# Patient Record
Sex: FEMALE | Race: WHITE | URBAN - METROPOLITAN AREA
[De-identification: names, ages, dates, MRNs, and addresses within clinical notes are randomized per-mention and may not be internally consistent; named-entity substitution may affect disease eponyms.]

---

## 2020-03-11 ENCOUNTER — OFFICE VISIT (OUTPATIENT)
Dept: OBGYN CLINIC | Age: 25
End: 2020-03-11

## 2020-03-11 VITALS
BODY MASS INDEX: 24.7 KG/M2 | DIASTOLIC BLOOD PRESSURE: 78 MMHG | WEIGHT: 148.25 LBS | SYSTOLIC BLOOD PRESSURE: 128 MMHG | HEIGHT: 65 IN

## 2020-03-11 DIAGNOSIS — M54.50 LOW BACK PAIN, UNSPECIFIED BACK PAIN LATERALITY, UNSPECIFIED CHRONICITY, UNSPECIFIED WHETHER SCIATICA PRESENT: Primary | ICD-10-CM

## 2020-03-11 DIAGNOSIS — Z20.2 POSSIBLE EXPOSURE TO STD: ICD-10-CM

## 2020-03-11 RX ORDER — IBUPROFEN 800 MG/1
800 TABLET ORAL
Qty: 40 TAB | Refills: 1 | Status: SHIPPED | OUTPATIENT
Start: 2020-03-11

## 2020-03-11 NOTE — PATIENT INSTRUCTIONS

## 2020-03-11 NOTE — PROGRESS NOTES
Back pain    CC: Lower back pain    HPI: Ms. Faustino Palacios is a 25 y.o.  who presents with a history of episodic back pain. Had TVUS elsewhere which she says was unremarkable. Not SA    Onset: months  Quality: crampy  Severity: bothersome  Timing: acute  Context: usually after menses but not always    Other associated symptoms:   Denies changes in bowel or bladder function. Denies fevers/chills. Denies dyspareunia.         Past Medical History:   Diagnosis Date    SVT (supraventricular tachycardia) (HCC)        Past Surgical History:   Procedure Laterality Date    HX SVT ABLATION         Family History   Problem Relation Age of Onset    Other Father         Graves disease       Social History     Socioeconomic History    Marital status: SINGLE     Spouse name: Not on file    Number of children: Not on file    Years of education: Not on file    Highest education level: Not on file   Occupational History    Not on file   Social Needs    Financial resource strain: Not on file    Food insecurity     Worry: Not on file     Inability: Not on file    Transportation needs     Medical: Not on file     Non-medical: Not on file   Tobacco Use    Smoking status: Never Smoker    Smokeless tobacco: Never Used   Substance and Sexual Activity    Alcohol use: Yes     Frequency: Monthly or less    Drug use: Yes     Types: Marijuana    Sexual activity: Yes     Partners: Male     Birth control/protection: Condom     Comment: condoms most of the time   Lifestyle    Physical activity     Days per week: Not on file     Minutes per session: Not on file    Stress: Not on file   Relationships    Social connections     Talks on phone: Not on file     Gets together: Not on file     Attends Anabaptism service: Not on file     Active member of club or organization: Not on file     Attends meetings of clubs or organizations: Not on file     Relationship status: Not on file    Intimate partner violence Fear of current or ex partner: Not on file     Emotionally abused: Not on file     Physically abused: Not on file     Forced sexual activity: Not on file   Other Topics Concern    Not on file   Social History Narrative    Not on file           Not on File    Review of Systems - History obtained from the patient  Constitutional: negative for weight loss, fever, night sweats  HEENT: negative for hearing loss, earache, congestion, snoring, sorethroat  CV: negative for chest pain, palpitations, edema  Resp: negative for cough, shortness of breath, wheezing  GI: negative for change in bowel habits, abdominal pain, black or bloody stools  : negative for frequency, dysuria, hematuria, vaginal discharge  MSK: negative for back pain, joint pain, muscle pain  Breast: negative for breast lumps, nipple discharge, galactorrhea  Skin :negative for itching, rash, hives  Neuro: negative for dizziness, headache, confusion, weakness  Psych: negative for anxiety, depression, change in mood  Heme/lymph: negative for bleeding, bruising, pallor      Physical Exam    Visit Vitals  /78 (BP 1 Location: Right arm, BP Patient Position: Sitting)   Ht 5' 5\" (1.651 m)   Wt 148 lb 4 oz (67.2 kg)   LMP 03/09/2020 (Exact Date)   BMI 24.67 kg/m²     Constitutional  · Appearance: well-nourished, well developed, alert, in no acute distress    HENT  · Head and Face: appears normal    Neck  · Inspection/Palpation: normal appearance, no masses or tenderness  · Lymph Nodes: no lymphadenopathy present  · Thyroid: gland size normal, nontender, no nodules or masses present on palpation    Chest  · Respiratory Effort: non-labored breathing  · Auscultation: CTAB, normal breath sounds    Cardiovascular  · Heart:  · Auscultation: regular rate and rhythm without murmur  · Extremities: no peripheral edema    Gastrointestinal  · Abdominal Examination: abdomen non-tender to palpation, normal bowel sounds, no masses present  · Liver and spleen: no hepatomegaly present, spleen not palpable  · Hernias: no hernias identified    Genitourinary  · External Genitalia: normal appearance for age, no discharge present, no tenderness present, no inflammatory lesions present, no masses present, no atrophy present  · Vagina: normal vaginal vault without central or paravaginal defects, no discharge present, no inflammatory lesions present, no masses present  · Bladder: non-tender to palpation  · Urethra: appears normal  · Cervix: normal, no cervicitis, no CMT  · Uterus: approximately 6 week sized  · Adnexa: no adnexal tenderness present, no adnexal masses present  · Perineum: perineum within normal limits, no evidence of trauma, no rashes or skin lesions present    Skin  · General Inspection: no rash, no lesions identified    Neurologic/Psychiatric  · Mental Status:  · Orientation: grossly oriented to person, place and time  · Mood and Affect: mood normal, affect appropriate        No results found for this or any previous visit. Assessment/Plan:  Back pain of unknown origin. Continue NSAIDS which appear to be working for her. STI testing sent  RTC for annual or sooner if needed.     Signed By: Jessica Silveira MD     March 11, 2020

## 2020-03-13 LAB — BACTERIA UR CULT: NO GROWTH

## 2020-03-16 LAB
A VAGINAE DNA VAG QL NAA+PROBE: ABNORMAL SCORE
BVAB2 DNA VAG QL NAA+PROBE: ABNORMAL SCORE
C ALBICANS DNA VAG QL NAA+PROBE: NEGATIVE
C GLABRATA DNA VAG QL NAA+PROBE: NEGATIVE
C TRACH DNA VAG QL NAA+PROBE: POSITIVE
MEGA1 DNA VAG QL NAA+PROBE: ABNORMAL SCORE
N GONORRHOEA DNA VAG QL NAA+PROBE: NEGATIVE
T VAGINALIS DNA VAG QL NAA+PROBE: NEGATIVE

## 2020-03-16 RX ORDER — AZITHROMYCIN 500 MG/1
1000 TABLET, FILM COATED ORAL ONCE
Qty: 2 TAB | Refills: 0 | Status: SHIPPED | OUTPATIENT
Start: 2020-03-16 | End: 2020-03-16

## 2020-03-16 RX ORDER — METRONIDAZOLE 500 MG/1
500 TABLET ORAL 2 TIMES DAILY
Qty: 14 TAB | Refills: 0 | Status: SHIPPED | OUTPATIENT
Start: 2020-03-16 | End: 2020-03-23

## 2020-03-17 NOTE — PROGRESS NOTES
Attempted to reach patient to discuss results. Patient did not answer and unable to leave a voicemail. Letter sent asking patient to call the office to discuss lab work results.

## 2020-03-20 ENCOUNTER — TELEPHONE (OUTPATIENT)
Dept: OBGYN CLINIC | Age: 25
End: 2020-03-20

## 2020-03-20 NOTE — PROGRESS NOTES
Per Dr. Milvia Hanson- called and spoke with patient. Informed her of lab results. Patient verbalized understanding and had no questions at this time.

## 2020-03-20 NOTE — TELEPHONE ENCOUNTER
Patient called very upset because she was just told she has chlamydia and BV. She said that she was really upset about getting chlamydia and wanted to know more about the prevention. We discussed that chlamydia is a STD infection that occurs from unprotected intercourse. We recommend always using protection during intercourse. She needs to not have intercourse until medication is taken and 2 weeks after to prevent spreading. She needs to tell her partner(s) so they may get treatment. Discussed she can take her medication to completion exactly as prescribed and recommended coming in for a JINNY to verify it cleared. Patient verbalized understanding.

## 2020-05-11 ENCOUNTER — OFFICE VISIT (OUTPATIENT)
Dept: OBGYN CLINIC | Age: 25
End: 2020-05-11

## 2020-05-11 VITALS
BODY MASS INDEX: 23.74 KG/M2 | WEIGHT: 142.5 LBS | SYSTOLIC BLOOD PRESSURE: 138 MMHG | HEIGHT: 65 IN | DIASTOLIC BLOOD PRESSURE: 82 MMHG

## 2020-05-11 DIAGNOSIS — Z86.19 HISTORY OF CHLAMYDIA: Primary | ICD-10-CM

## 2020-05-11 NOTE — PATIENT INSTRUCTIONS
Pelvic Exam: Care Instructions Your Care Instructions When your doctor examines all of your pelvic organs, it's called a pelvic exam. Two good reasons to have this kind of exam are to check for sexually transmitted infections (STIs) and to get a Pap test. A Pap test is also called a Pap smear. It checks for early changes that can lead to cancer of the cervix. Sometimes a pelvic exam is part of a regular checkup. Your doctor may ask you to avoid vaginal sex, tampons, vaginal medicines, vaginal sprays or powders, and douching for 1 to 2 days before the test. 
Other times, women have this kind of exam at any time of the month. This is because they have pelvic pain, bleeding, or discharge. Or they may have another pelvic problem. Before your exam, it's important to share some information with your doctor. For example, if you are a survivor of rape or sexual abuse, you can talk about any concerns you may have. Your doctor will also want to know if you are pregnant or use birth control. And he or she will want to hear about any problems, surgeries, or procedures you have had in your pelvic area. You will also need to tell your doctor when your last period was. Follow-up care is a key part of your treatment and safety. Be sure to make and go to all appointments, and call your doctor if you are having problems. It's also a good idea to know your test results and keep a list of the medicines you take. How is a pelvic exam done? · During a pelvic exam, you will: ? Take off your clothes below the waist. You will get a paper or cloth cover to put over the lower half of your body. If this is regular checkup, you may undress completely and put on a gown. ? Lie on your back on an exam table. Your feet will be raised above you. Stirrups will support your feet. · The doctor will: ? Ask you to relax your knees. Your knees need to lean out, toward the walls. ? Check the opening of your vagina for sores or swelling. ? Gently put a tool called a speculum into your vagina. It opens the vagina a little bit. You will feel some pressure. But if you are relaxed, it will not hurt. It lets your doctor see inside the vagina. ? Use a small brush, spatula, or swab to get a sample of cells, if you are having a Pap test or culture. The doctor then removes the speculum. ? Put on gloves and put one or two fingers of one hand into your vagina. The other hand goes on your lower belly. This lets your doctor feel your pelvic organs. You will probably feel some pressure. Try to stay relaxed. ? Put one gloved finger into your rectum and one into your vagina, if needed. This can also help check your pelvic organs. This exam takes about 10 minutes. At the end, you will get a washcloth or tissue to clean your vaginal area. You can then get dressed. Why is a pelvic exam done? A pelvic exam may be done: · As part of a woman's regular physical checkup. The exam may include a Pap test. 
· To check for vaginal infection. · To check for sexually transmitted infections, such as chlamydia or herpes. · To help find the cause of abnormal uterine bleeding. · To look for problems like uterine fibroids, ovarian cysts, or uterine prolapse. · To find the cause of pelvic or belly pain. · Before inserting an intrauterine device (IUD) for birth control. · To collect evidence if you've been sexually assaulted. What are the risks of a pelvic exam? 
There is a small chance that the doctor will find something on a pelvic exam that would not have caused a problem. This is called overdiagnosis. It could lead to tests or treatment you don't need. When should you call for help? Watch closely for changes in your health, and be sure to contact your doctor if you have any problems. Where can you learn more? Go to http://shiv-aissatou.info/ Enter H948 in the search box to learn more about \"Pelvic Exam: Care Instructions. \" 
 Current as of: November 7, 2019Content Version: 12.4 © 7102-9651 [employer]. Care instructions adapted under license by [employer] (which disclaims liability or warranty for this information). If you have questions about a medical condition or this instruction, always ask your healthcare professional. [address] any warranty or liability for your use of this information.

## 2020-05-11 NOTE — PROGRESS NOTES
Problem Visit  CC: JINNY    HPI: Ms. Maci Sequeira is a 25 y.o. [de-identified]  female presenting for a jinny for chlamydia.     Onset: last month  Location: cervix  Severity: bothersome  Context: 2 partners    OB History        0    Para   0    Term   0       0    AB   0    Living   0       SAB   0    TAB   0    Ectopic   0    Molar   0    Multiple   0    Live Births   0                Past Medical History:   Diagnosis Date    SVT (supraventricular tachycardia) (HCC)        Past Surgical History:   Procedure Laterality Date    HX SVT ABLATION         Family History   Problem Relation Age of Onset    Other Father         Graves disease       Social History     Socioeconomic History    Marital status: SINGLE     Spouse name: Not on file    Number of children: Not on file    Years of education: Not on file    Highest education level: Not on file   Occupational History    Not on file   Social Needs    Financial resource strain: Not on file    Food insecurity     Worry: Not on file     Inability: Not on file    Transportation needs     Medical: Not on file     Non-medical: Not on file   Tobacco Use    Smoking status: Never Smoker    Smokeless tobacco: Never Used   Substance and Sexual Activity    Alcohol use: Yes     Frequency: Monthly or less    Drug use: Yes     Types: Marijuana    Sexual activity: Yes     Partners: Male     Birth control/protection: Condom     Comment: condoms most of the time   Lifestyle    Physical activity     Days per week: Not on file     Minutes per session: Not on file    Stress: Not on file   Relationships    Social connections     Talks on phone: Not on file     Gets together: Not on file     Attends Faith service: Not on file     Active member of club or organization: Not on file     Attends meetings of clubs or organizations: Not on file     Relationship status: Not on file    Intimate partner violence     Fear of current or ex partner: Not on file Emotionally abused: Not on file     Physically abused: Not on file     Forced sexual activity: Not on file   Other Topics Concern    Not on file   Social History Narrative    Not on file       Current Outpatient Medications   Medication Sig Dispense Refill    ibuprofen (MOTRIN) 800 mg tablet Take 1 Tab by mouth every eight (8) hours as needed for Pain.  40 Tab 1       Not on File    Review of Systems - History obtained from the patient  Constitutional: negative for weight loss, fever, night sweats  HEENT: negative for hearing loss, earache, congestion, snoring, sorethroat  CV: negative for chest pain, palpitations, edema  Resp: negative for cough, shortness of breath, wheezing  GI: negative for change in bowel habits, abdominal pain, black or bloody stools  : negative for frequency, dysuria, hematuria  MSK: negative for back pain, joint pain, muscle pain  Skin :negative for itching, rash, hives  Neuro: negative for dizziness, headache, confusion, weakness  Psych: negative for anxiety, depression, change in mood  Heme/lymph: negative for bleeding, bruising, pallor    Physical Exam    Visit Vitals  /82 (BP 1 Location: Right arm, BP Patient Position: Sitting)   Ht 5' 5\" (1.651 m)   Wt 142 lb 8 oz (64.6 kg)   LMP 05/09/2020 (Exact Date)   BMI 23.71 kg/m²       HENT  · Head and Face: appears normal    Neck  · Inspection/Palpation: normal appearance, no masses or tenderness  · Lymph Nodes: no lymphadenopathy present  · Thyroid: gland size normal, nontender, no nodules or masses present on palpation    Chest  · Respiratory Effort: non-labored breathing  · Auscultation: Clear to auscultation bilaterlly, normal breath sounds    Cardiovascular  · Heart:  · Auscultation: regular rate and rhythm without murmur  · Extremities: no peripheral edema    Gastrointestinal  · Abdominal Examination: abdomen non-tender to palpation, normal bowel sounds, no masses present  · Liver and spleen: no hepatomegaly present, spleen not palpable  · Hernias: no hernias identified    Genitourinary  · External Genitalia: normal appearance for age, no discharge present, no tenderness present, no inflammatory lesions present, no masses present, no atrophy present  · Vagina: normal vaginal vault without central or paravaginal defects, no inflammatory lesions present, no masses present, no discharge present  · Bladder: non-tender to palpation  · Urethra: appears normal  · Cervix: normal, no cervicitis, no CMT  · Uterus: normal size, shape and consistency, mobile  · Adnexa: no adnexal tenderness present, no adnexal masses present  · Perineum: perineum within normal limits, no evidence of trauma, no rashes or skin lesions present    Skin  · General Inspection: no rash, no lesions identified    Neurologic/Psychiatric  · Mental Status:  · Orientation: grossly oriented to person, place and time  · Mood and Affect: mood normal, affect appropriate    Results for orders placed or performed in visit on 03/11/20   CULTURE, URINE   Result Value Ref Range    Urine Culture, Routine No growth    NUSWAB VAGINITIS PLUS   Result Value Ref Range    Atopobium vaginae Moderate - 1 Score    BVAB 2 High - 2 (A) Score    Megasphaera 1 High - 2 (A) Score    C. albicans, ELIJAH Negative Negative    C. glabrata, ELIJAH Negative Negative    T. vaginalis, ELIJAH Negative Negative    C. trachomatis, ELIJAH Positive (A) Negative    N. gonorrhoeae, ELIJAH Negative Negative         Assessment/Plan:  STI testing sent    RTC: for annual , or sooner prn for problems or concerns. Handouts and instructions provided.     Marcelo Martin  5/11/2020  10:20 AM

## 2020-05-20 LAB
A VAGINAE DNA VAG QL NAA+PROBE: NORMAL SCORE
BVAB2 DNA VAG QL NAA+PROBE: NORMAL SCORE
C ALBICANS DNA VAG QL NAA+PROBE: NEGATIVE
C GLABRATA DNA VAG QL NAA+PROBE: NEGATIVE
C TRACH DNA VAG QL NAA+PROBE: NEGATIVE
MEGA1 DNA VAG QL NAA+PROBE: NORMAL SCORE
N GONORRHOEA DNA VAG QL NAA+PROBE: NEGATIVE
T VAGINALIS DNA VAG QL NAA+PROBE: NEGATIVE

## 2023-02-09 ENCOUNTER — HOSPITAL ENCOUNTER (EMERGENCY)
Age: 28
Discharge: HOME | End: 2023-02-09
Attending: EMERGENCY MEDICINE
Payer: COMMERCIAL

## 2023-02-09 ENCOUNTER — APPOINTMENT (OUTPATIENT)
Dept: GENERAL RADIOLOGY | Age: 28
End: 2023-02-09
Attending: EMERGENCY MEDICINE
Payer: COMMERCIAL

## 2023-02-09 VITALS
WEIGHT: 160 LBS | RESPIRATION RATE: 18 BRPM | HEART RATE: 94 BPM | BODY MASS INDEX: 26.66 KG/M2 | HEIGHT: 65 IN | SYSTOLIC BLOOD PRESSURE: 116 MMHG | DIASTOLIC BLOOD PRESSURE: 62 MMHG | OXYGEN SATURATION: 96 % | TEMPERATURE: 98 F

## 2023-02-09 DIAGNOSIS — E86.0 DEHYDRATION: ICD-10-CM

## 2023-02-09 DIAGNOSIS — R55 VASOVAGAL SYNCOPE: Primary | ICD-10-CM

## 2023-02-09 DIAGNOSIS — F41.9 ANXIETY: ICD-10-CM

## 2023-02-09 LAB
ALBUMIN SERPL-MCNC: 3.8 G/DL (ref 3.5–5)
ALBUMIN/GLOB SERPL: 1 (ref 1.1–2.2)
ALP SERPL-CCNC: 48 U/L (ref 45–117)
ALT SERPL-CCNC: 13 U/L (ref 12–78)
ANION GAP SERPL CALC-SCNC: 5 MMOL/L (ref 5–15)
APPEARANCE UR: CLEAR
AST SERPL-CCNC: 15 U/L (ref 15–37)
BACTERIA URNS QL MICRO: NEGATIVE /HPF
BASOPHILS # BLD: 0.1 K/UL (ref 0–0.1)
BASOPHILS NFR BLD: 0 % (ref 0–1)
BILIRUB SERPL-MCNC: 0.5 MG/DL (ref 0.2–1)
BILIRUB UR QL: NEGATIVE
BNP SERPL-MCNC: 20 PG/ML
BUN SERPL-MCNC: 13 MG/DL (ref 6–20)
BUN/CREAT SERPL: 18 (ref 12–20)
CALCIUM SERPL-MCNC: 8.7 MG/DL (ref 8.5–10.1)
CHLORIDE SERPL-SCNC: 108 MMOL/L (ref 97–108)
CO2 SERPL-SCNC: 25 MMOL/L (ref 21–32)
COLOR UR: ABNORMAL
CREAT SERPL-MCNC: 0.72 MG/DL (ref 0.55–1.02)
DIFFERENTIAL METHOD BLD: ABNORMAL
EOSINOPHIL # BLD: 0.5 K/UL (ref 0–0.4)
EOSINOPHIL NFR BLD: 4 % (ref 0–7)
EPITH CASTS URNS QL MICRO: ABNORMAL /LPF
ERYTHROCYTE [DISTWIDTH] IN BLOOD BY AUTOMATED COUNT: 13.1 % (ref 11.5–14.5)
GLOBULIN SER CALC-MCNC: 3.7 G/DL (ref 2–4)
GLUCOSE SERPL-MCNC: 103 MG/DL (ref 65–100)
GLUCOSE UR STRIP.AUTO-MCNC: NEGATIVE MG/DL
HCG UR QL: NEGATIVE
HCT VFR BLD AUTO: 38.2 % (ref 35–47)
HGB BLD-MCNC: 12.4 G/DL (ref 11.5–16)
HGB UR QL STRIP: ABNORMAL
HYALINE CASTS URNS QL MICRO: ABNORMAL /LPF (ref 0–2)
IMM GRANULOCYTES # BLD AUTO: 0.1 K/UL (ref 0–0.04)
IMM GRANULOCYTES NFR BLD AUTO: 0 % (ref 0–0.5)
KETONES UR QL STRIP.AUTO: NEGATIVE MG/DL
LEUKOCYTE ESTERASE UR QL STRIP.AUTO: NEGATIVE
LYMPHOCYTES # BLD: 3.5 K/UL (ref 0.8–3.5)
LYMPHOCYTES NFR BLD: 26 % (ref 12–49)
MCH RBC QN AUTO: 28.2 PG (ref 26–34)
MCHC RBC AUTO-ENTMCNC: 32.5 G/DL (ref 30–36.5)
MCV RBC AUTO: 86.8 FL (ref 80–99)
MONOCYTES # BLD: 1.1 K/UL (ref 0–1)
MONOCYTES NFR BLD: 8 % (ref 5–13)
NEUTS SEG # BLD: 8 K/UL (ref 1.8–8)
NEUTS SEG NFR BLD: 62 % (ref 32–75)
NITRITE UR QL STRIP.AUTO: NEGATIVE
NRBC # BLD: 0 K/UL (ref 0–0.01)
NRBC BLD-RTO: 0 PER 100 WBC
PH UR STRIP: 6 (ref 5–8)
PLATELET # BLD AUTO: 317 K/UL (ref 150–400)
PMV BLD AUTO: 9.4 FL (ref 8.9–12.9)
POTASSIUM SERPL-SCNC: 3.5 MMOL/L (ref 3.5–5.1)
PROT SERPL-MCNC: 7.5 G/DL (ref 6.4–8.2)
PROT UR STRIP-MCNC: NEGATIVE MG/DL
RBC # BLD AUTO: 4.4 M/UL (ref 3.8–5.2)
RBC #/AREA URNS HPF: ABNORMAL /HPF (ref 0–5)
SODIUM SERPL-SCNC: 138 MMOL/L (ref 136–145)
SP GR UR REFRACTOMETRY: 1.01
TROPONIN I SERPL HS-MCNC: <4 NG/L (ref 0–51)
UA: UC IF INDICATED,UAUC: ABNORMAL
UROBILINOGEN UR QL STRIP.AUTO: 0.2 EU/DL (ref 0.2–1)
WBC # BLD AUTO: 13.1 K/UL (ref 3.6–11)
WBC URNS QL MICRO: ABNORMAL /HPF (ref 0–4)

## 2023-02-09 PROCEDURE — 74011250636 HC RX REV CODE- 250/636: Performed by: EMERGENCY MEDICINE

## 2023-02-09 PROCEDURE — 96360 HYDRATION IV INFUSION INIT: CPT

## 2023-02-09 PROCEDURE — 84484 ASSAY OF TROPONIN QUANT: CPT

## 2023-02-09 PROCEDURE — 99285 EMERGENCY DEPT VISIT HI MDM: CPT

## 2023-02-09 PROCEDURE — 80053 COMPREHEN METABOLIC PANEL: CPT

## 2023-02-09 PROCEDURE — 83880 ASSAY OF NATRIURETIC PEPTIDE: CPT

## 2023-02-09 PROCEDURE — 93005 ELECTROCARDIOGRAM TRACING: CPT

## 2023-02-09 PROCEDURE — 74011250637 HC RX REV CODE- 250/637: Performed by: EMERGENCY MEDICINE

## 2023-02-09 PROCEDURE — 85025 COMPLETE CBC W/AUTO DIFF WBC: CPT

## 2023-02-09 PROCEDURE — 71045 X-RAY EXAM CHEST 1 VIEW: CPT

## 2023-02-09 PROCEDURE — 36415 COLL VENOUS BLD VENIPUNCTURE: CPT

## 2023-02-09 PROCEDURE — 81001 URINALYSIS AUTO W/SCOPE: CPT

## 2023-02-09 PROCEDURE — 81025 URINE PREGNANCY TEST: CPT

## 2023-02-09 RX ORDER — HYDROXYZINE 50 MG/1
50 TABLET, FILM COATED ORAL
Qty: 20 TABLET | Refills: 0 | Status: SHIPPED | OUTPATIENT
Start: 2023-02-09 | End: 2023-02-19

## 2023-02-09 RX ORDER — LORAZEPAM 1 MG/1
1 TABLET ORAL
Status: COMPLETED | OUTPATIENT
Start: 2023-02-09 | End: 2023-02-09

## 2023-02-09 RX ORDER — HYDROXYZINE 25 MG/1
50 TABLET, FILM COATED ORAL
Status: COMPLETED | OUTPATIENT
Start: 2023-02-09 | End: 2023-02-09

## 2023-02-09 RX ORDER — ONDANSETRON 4 MG/1
4 TABLET, ORALLY DISINTEGRATING ORAL
Status: COMPLETED | OUTPATIENT
Start: 2023-02-09 | End: 2023-02-09

## 2023-02-09 RX ADMIN — LORAZEPAM 1 MG: 1 TABLET ORAL at 06:35

## 2023-02-09 RX ADMIN — HYDROXYZINE HYDROCHLORIDE 50 MG: 25 TABLET, FILM COATED ORAL at 05:41

## 2023-02-09 RX ADMIN — ONDANSETRON 4 MG: 4 TABLET, ORALLY DISINTEGRATING ORAL at 06:34

## 2023-02-09 RX ADMIN — SODIUM CHLORIDE 1000 ML: 9 INJECTION, SOLUTION INTRAVENOUS at 04:21

## 2023-02-09 NOTE — ED NOTES
Report given to CHERY Joel. Nurse was informed of reason for arrival, vitals, labs, medications, orders, procedures, results, anything left pending and current plan of action. Questions were asked and received prior to departure from the patient.

## 2023-02-09 NOTE — DISCHARGE INSTRUCTIONS
It was a pleasure taking care of you in our Emergency Department today. We know that when you come to [facility], you are entrusting us with your health, comfort, and safety. Our physicians and nurses honor that trust, and truly appreciate the opportunity to care for you and your loved ones. We also value your feedback. If you receive a survey about your Emergency Department experience today, please fill it out. We care about our patients' feedback, and we listen to what you have to say.   Thank you!       --- Dr. Daron Vyas MD

## 2023-02-09 NOTE — Clinical Note
[address]  [facility] EMERGENCY DEPT  [address] [place] [id number] [phone]    Work/School Note    Date: 2/9/2023    To Whom It May concern:    Alexa Beverly was seen and treated today in the emergency room by the following provider(s):  Attending Provider: Eric Coleman MD.      Alexa Beverly is excused from work/school on 02/09/23 and 02/10/23. She is medically clear to return to work/school on 2/11/2023.        Sincerely,          Génesis Quiñones MD

## 2023-02-09 NOTE — ED PROVIDER NOTES
[facility] EMERGENCY DEPT  EMERGENCY DEPARTMENT ENCOUNTER       Pt Name: Sara Harrison  MRN: [id number]  [place] 1995  Date of evaluation: 2/9/2023  Provider: Mohan Flores MD   PCP: No primary care provider on file. Note Started: 4:10 AM 2/9/23     CHIEF COMPLAINT       Chief Complaint   Patient presents with    Chest Pain     Pt arrives to triage from EMS after waking up ~0230 for substernal chest pain and palpitations. Post bowel movement she felt better. Hx of x2 heart ablations in past for SVT. 324 ASA given PTA. HISTORY OF PRESENT ILLNESS: 1 or more elements      History From: Patient and Patient's Mother  HPI Limitations : None     Sara Harrison is a 32 y.o. female who presents with substernal chest pain, palpitations and after a syncopal episode which occurred this evening prior to arrival.  She has a history of anxiety, currently not treated. Also history of multiple syncopal episodes going back to teenage years. Was diagnosed with svt as teenager, s/p two ablations. Evaluated by cardiology for syncopal episodes, had full workup including halter monitor, cleared from their standpoint as syncope was not thought to be due to heart disease or serious arrhythmia. Please see more comprehensive history below under MDM  Nursing Notes were all reviewed in real time as they are made available. Any disagreements addressed in the HPI/MDM. REVIEW OF SYSTEMS      Review of Systems   Constitutional:  Negative for chills, fatigue and fever. HENT:  Negative for congestion, postnasal drip, rhinorrhea, sore throat and trouble swallowing. Eyes:  Negative for photophobia and visual disturbance. Respiratory:  Positive for chest tightness. Negative for cough, shortness of breath and wheezing. Cardiovascular:  Positive for palpitations. Negative for chest pain and leg swelling. Gastrointestinal:  Negative for abdominal pain, diarrhea, nausea and vomiting.    Genitourinary:  Negative for difficulty urinating, dysuria, flank pain, frequency, hematuria, pelvic pain, urgency and vaginal discharge. Musculoskeletal:  Negative for back pain and myalgias. Skin:  Negative for color change and pallor. Neurological:  Positive for syncope and light-headedness. Negative for tremors, seizures, facial asymmetry, speech difficulty, weakness, numbness and headaches. Hematological:  Negative for adenopathy. Psychiatric/Behavioral:  Negative for confusion. Positives and Pertinent negatives as per HPI and MDM. PAST HISTORY     Past Medical History:  Past Medical History:   Diagnosis Date    SVT (supraventricular tachycardia) ([address]        Past Surgical History:  Past Surgical History:   Procedure Laterality Date    HX SVT ABLATION         Family History:  Family History   Problem Relation Age of Onset    Other Father         Graves disease       Social History:  Social History     Tobacco Use    Smoking status: Never    Smokeless tobacco: Never   Substance Use Topics    Alcohol use: Yes    Drug use: Yes     Types: Marijuana       Allergies:  No Known Allergies    CURRENT MEDICATIONS      Previous Medications    IBUPROFEN (MOTRIN) 800 MG TABLET    Take 1 Tab by mouth every eight (8) hours as needed for Pain. PHYSICAL EXAM      ED Triage Vitals [02/09/23 0324]   ED Encounter Vitals Group      /72      Pulse (Heart Rate) 99      Resp Rate 16      Temp 97.9 °F (36.6 °C)      Temp src       O2 Sat (%) 99 %      Weight 160 lb      Height 5' 5\"        Physical Exam  Vitals and nursing note reviewed. Constitutional:       Appearance: She is not diaphoretic. HENT:      Head: Atraumatic. Eyes:      Extraocular Movements: Extraocular movements intact. Neck:      Vascular: No JVD. Cardiovascular:      Rate and Rhythm: Regular rhythm. Tachycardia present. Heart sounds: Normal heart sounds. No murmur heard.   Pulmonary:      Effort: Pulmonary effort is normal.      Breath sounds: Normal breath sounds. Chest:      Chest wall: No tenderness. Abdominal:      General: Bowel sounds are normal.      Palpations: Abdomen is soft. Tenderness: There is no abdominal tenderness. Musculoskeletal:         General: Normal range of motion. Cervical back: Normal range of motion and neck supple. Right lower leg: No tenderness. No edema. Left lower leg: No tenderness. No edema. Skin:     General: Skin is warm and dry. Capillary Refill: Capillary refill takes less than 2 seconds. Coloration: Skin is not pale. Findings: No rash. Neurological:      General: No focal deficit present. Mental Status: She is alert and oriented to person, place, and time. Psychiatric:         Mood and Affect: Mood is anxious. DIAGNOSTIC RESULTS   LABS:     Recent Results (from the past 24 hour(s))   EKG, 12 LEAD, INITIAL    Collection Time: 02/09/23  3:29 AM   Result Value Ref Range    Ventricular Rate 97 BPM    Atrial Rate 97 BPM    P-R Interval 176 ms    QRS Duration 118 ms    Q-T Interval 356 ms    QTC Calculation (Bezet) 452 ms    Calculated P Axis 27 degrees    Calculated R Axis 68 degrees    Calculated T Axis 68 degrees    Diagnosis       Normal sinus rhythm  Incomplete right bundle branch block  No previous ECGs available     CBC WITH AUTOMATED DIFF    Collection Time: 02/09/23  3:37 AM   Result Value Ref Range    WBC 13.1 (H) 3.6 - 11.0 K/uL    RBC 4.40 3.80 - 5.20 M/uL    HGB 12.4 11.5 - 16.0 g/dL    HCT 38.2 35.0 - 47.0 %    MCV 86.8 80.0 - 99.0 FL    MCH 28.2 26.0 - 34.0 PG    MCHC 32.5 30.0 - 36.5 g/dL    RDW 13.1 11.5 - 14.5 %    PLATELET 154 905 - 231 K/uL    MPV 9.4 8.9 - 12.9 FL    NRBC 0.0 0  WBC    ABSOLUTE NRBC 0.00 0.00 - 0.01 K/uL    NEUTROPHILS 62 32 - 75 %    LYMPHOCYTES 26 12 - 49 %    MONOCYTES 8 5 - 13 %    EOSINOPHILS 4 0 - 7 %    BASOPHILS 0 0 - 1 %    IMMATURE GRANULOCYTES 0 0.0 - 0.5 %    ABS. NEUTROPHILS 8.0 1.8 - 8.0 K/UL    ABS.  LYMPHOCYTES 3.5 0.8 - 3.5 K/UL    ABS. MONOCYTES 1.1 (H) 0.0 - 1.0 K/UL    ABS. EOSINOPHILS 0.5 (H) 0.0 - 0.4 K/UL    ABS. BASOPHILS 0.1 0.0 - 0.1 K/UL    ABS. IMM. GRANS. 0.1 (H) 0.00 - 0.04 K/UL    DF AUTOMATED             RADIOLOGY:  Non-plain film images such as CT, Ultrasound and MRI are read by the radiologist. Plain radiographic images are visualized and preliminarily interpreted by the ED Provider with the below findings:     Cxr reviewed as soon as images were available. No acute process identified. Final radiology read to follow and will be copied below. Interpretation per the Radiologist below, if available at the time of this note:     XR CHEST PORT   Final Result      No acute process. PROCEDURES       SCREENINGS     The patient does not have any of the following risk factors for the [facility] Syncope Rule ([address]):   C - History of congestive heart failure  H - Hematocrit < 30%  E - Abnormal ECG  S - Shortness of breath  S - Triage systolic blood pressure < 90       CRITICAL CARE TIME       EMERGENCY DEPARTMENT COURSE and DIFFERENTIAL DIAGNOSIS/MDM     Vitals:    Vitals:    02/09/23 0324   BP: 112/72   Pulse: 99   Resp: 16   Temp: 97.9 °F (36.6 °C)   SpO2: 99%   Weight: 72.6 kg (160 lb)   Height: 5' 5\" (1.651 m)       Pertinent Chronic Medical Conditions or Prior Surgeries: recurrent syncope, anxiety, sinus tachycardia    Social Determinants affecting Dx or Tx: None    Records Reviewed: Prior medical records, including:  --Primary care visit (dr. Jon Carrizales at [facility]) in dec 2020. Seen for recurrent syncope. At that time noted to be slightly tachycardic. Referred to cardiology. Per patient she did so and had negative workup including normal halter monitor, cleared from their perspective. --Cardiology (dr. Rosamaria Cherry at [facility]) visit in feb 2012 for evaluation of episodes of svt, underwent ablation. Per cardiology impression:  IMPRESSION:   1.  History of supraventricular tachycardia, with findings of   atrioventricular node reentry tachycardia, status post successful   atrioventricular node modification. 2. Electrocardiogram suggestive of Brugada, but by procainamide   challenging, no channelopathy findings were found. EKG interpretation: Rhythm: sinus; and regular . Rate (approx.): 97; ST/T wave: nml; Other intervals normal. No new changes concerning for acute ischemia. This and prior available ECGs have been viewed and interpreted by me personally. Sury Burns MD, Msc    Cardiac monitor interpretation: nsr, regular, rate normal to slightly tachy in low 100s  Pulse ox interpretation: % on ra, normal    CC/HPI Summary, DDx, ED Course, and Reassessment:   Pt presents with palpitations, chest pressure which resolved by the time she came to the ed and after a syncopal episode at home which occurred this evening. She says that she got up to use the bathroom, started feeling very faint then had brief syncopal episode. Did not hit her head but lowered herself onto the ground. Reports syncope lasting very briefly, she called her mom after that who advised her to call ambulance and go to the er. Chest pain has now resolved. She still has some mild palpitations and sensation of hot flashes. History of anxiety and is wondering if some of her symptoms are related to anxiety. She used to be on anxiety medications but no longer is taking them because she moved to this area a few years ago and has not established herself with  therapist yet. Medical history as noted above including history of svt s/p ablation. Subsequent cardiology evaluations, including for syncope episodes, were normal and she was cleared from their perspective. Tonight's event per patient is identical to what she has experienced in the past.  She denies having cp now, also denies having sob, cough, recent illness.  Currently on menstrual cycle and states that in the past being on her cycle has triggered similar episodes. Overall her clinical presentation most c/w vasovagal syncope. She has dry mucus membranes on exam and reports poor fluid intake over the day which may contribute. Lastly, history of anxiety may contributed to palpitations as well. She has a slight thachycardia but states that this is often the case particularly in healthcare settings. Looking back at her last pmd office note, her hr then was 111. Rest of vitals are normal and she now feels anxious slightly but better. Pt is without c/o of headache, intractable vertigo/dizziness, or ataxia on exam. circumstances surrounding the syncopal event are not consistent with ACS and there is no current evidence of CHF or valvular heart disease, no significantly abnormal ECG, nor exertional syncope. Ddx: vasovagal syncope, sinus tachycardia, dehydration, anemia, electrolyte abnormalities. Considered pe but less likely given history of same events going back about 10 years. Plan: full heme and metabolic panels, trop, bnp, cxr, ua. Provide iv fluids, keep on monitor and reassess. If labs are normal and she feels better, I think she can be discharged home with close outpatient follow up. Medical Decision Making  Amount and/or Complexity of Data Reviewed  Labs: ordered. Radiology: ordered. ECG/medicine tests: ordered. Risk  Prescription drug management. Disposition Considerations (Tests not done, Shared Decision Making, Pt Expectation of Test or Tx.):    I have discussed with the patient and/or caregiver my initial clinical impression which is based on an evidence-based clinical evaluation of the patient and interpretation of available results. Involved patient and/or caregiver in management, treatment options and final disposition. Patient and/or caregiver verbalizes understanding and agreement.     I completed a structured, evidence-based clinical evaluation to screen for cardiac and other potentially dangerous causes of syncope  in this patient. The evidence indicates that the patient is very low risk for a cardiac or other dangerous cause of syncope and this is  consistent with my clinical intuition. The risk of further workup or hospitalization for cardiac or other dangerous cause is likely higher than the risk of the patient having a  cardiac or other dangerous cause of syncope. It is, therefore, in the patients best interest not to do additional emergent testing or  hospitalize the patient for syncope at this time. ED Medications Administered  Medications - No data to display    ED Orders Placed:  Orders Placed This Encounter    XR CHEST PORT    CBC WITH AUTOMATED DIFF    METABOLIC PANEL, COMPREHENSIVE    NT-PRO BNP    TROPONIN-HIGH SENSITIVITY    URINALYSIS W/ REFLEX CULTURE    CHEST PAIN PANEL TRACKING (DO NOT DESELECT)    CARDIAC MONITOR - ED ONLY    OXYGEN CANNULA    VITAL SIGNS Per Protocol    MEASURE HEIGHT    WEIGH PATIENT    POC URINE PREGNANCY TEST    OXYGEN CANNULA Liters per minute: 2; Indications for O2 therapy: CHEST PAIN CONTINUOUS STAT    EKG, 12 LEAD, INITIAL    SALINE LOCK IV ONE TIME STAT         FINAL IMPRESSION     1. Vasovagal syncope    2. Anxiety    3. Dehydration          DISPOSITION/PLAN     Progress note:  Patient has been reassessed and reports feeling considerably better, has normal vital signs and feels comfortable going home. I think this is reasonable as no findings today suggest a life-threatening condition. DISPOSITION: DISCHARGE  The patient's results have been reviewed with patient and available family and/or caregiver. They verbally convey their understanding and agreement of the patient's signs, symptoms, diagnosis, treatment and prognosis and additionally agree to follow up as recommended in the discharge instructions or to return to the Emergency Department should the patient's condition change prior to their follow-up appointment.    The patient and available family and/or caregiver verbally agree with the care plan and all of their questions have been answered. The discharge instructions have also been provided to the them with educational information regarding the patient's diagnosis as well a list of reasons why the patient would want to return to the ER prior to their follow-up appointment should any concerns arise, the patient's condition change or symptoms worsen. Joyce Cao MD, Msc    PLAN:  Current Discharge Medication List        START taking these medications    Details   hydrOXYzine HCL (ATARAX) 50 mg tablet Take 1 Tablet by mouth every six (6) hours as needed for Anxiety for up to 10 days. Qty: 20 Tablet, Refills: 0  Start date: 2/9/2023, End date: 2/19/2023           2. Follow-up Information       Follow up With Specialties Details Why Contact Info    primary care doctor/clinic  Schedule an appointment as soon as possible for a visit       [facility] EMERGENCY DEPT Emergency Medicine Go to  As needed, If symptoms worsen [address]  [address] [facility]  [phone]          3. Return to ED if worse       I am the Primary Clinician of Record. Campos Emerson MD (electronically signed)    (Please note that parts of this dictation were completed with voice recognition software. Quite often unanticipated grammatical, syntax, homophones, and other interpretive errors are inadvertently transcribed by the computer software. Please disregards these errors.  Please excuse any errors that have escaped final proofreading.)

## 2023-02-10 LAB
ATRIAL RATE: 97 BPM
CALCULATED P AXIS, ECG09: 27 DEGREES
CALCULATED R AXIS, ECG10: 68 DEGREES
CALCULATED T AXIS, ECG11: 68 DEGREES
DIAGNOSIS, 93000: NORMAL
P-R INTERVAL, ECG05: 176 MS
Q-T INTERVAL, ECG07: 356 MS
QRS DURATION, ECG06: 118 MS
QTC CALCULATION (BEZET), ECG08: 452 MS
VENTRICULAR RATE, ECG03: 97 BPM

## 2023-05-18 RX ORDER — IBUPROFEN 800 MG/1
800 TABLET ORAL EVERY 8 HOURS PRN
COMMUNITY
Start: 2020-03-11